# Patient Record
Sex: MALE | URBAN - METROPOLITAN AREA
[De-identification: names, ages, dates, MRNs, and addresses within clinical notes are randomized per-mention and may not be internally consistent; named-entity substitution may affect disease eponyms.]

---

## 2020-02-27 ENCOUNTER — IMPORTED ENCOUNTER (OUTPATIENT)
Dept: URBAN - METROPOLITAN AREA CLINIC 1 | Facility: CLINIC | Age: 60
End: 2020-02-27

## 2020-02-27 PROBLEM — H25.13: Noted: 2020-02-27

## 2020-02-27 PROBLEM — H10.31: Noted: 2020-02-27

## 2020-02-27 PROBLEM — H16.143: Noted: 2020-02-27

## 2020-02-27 PROBLEM — H04.123: Noted: 2020-02-27

## 2020-02-27 PROCEDURE — 92002 INTRM OPH EXAM NEW PATIENT: CPT

## 2020-02-27 NOTE — PATIENT DISCUSSION
1.  Unspecified Acute Conjunctivitis OD -- Possible FB earlier today none seen today during slit lamp exam. 2.  DARIANA w/ PEK OD>OS -- Begin ATs QID OD. (Sample of Refresh Sudarshan given) Advised patient to wear sunglasses until photophobia resolves. 3.  Cataract OU -- Observe for now without intervention. Return for an appointment in 30 PRN with Dr. Winsome Lau.

## 2022-04-03 ASSESSMENT — VISUAL ACUITY
OS_SC: 20/20
OD_SC: 20/20